# Patient Record
Sex: FEMALE | Race: WHITE | NOT HISPANIC OR LATINO | Employment: FULL TIME | URBAN - METROPOLITAN AREA
[De-identification: names, ages, dates, MRNs, and addresses within clinical notes are randomized per-mention and may not be internally consistent; named-entity substitution may affect disease eponyms.]

---

## 2024-06-03 ENCOUNTER — HOSPITAL ENCOUNTER (OUTPATIENT)
Facility: CLINIC | Age: 29
Setting detail: OBSERVATION
Discharge: STILL ADMITTED OR ADMINISTRATIVE | End: 2024-06-06
Attending: EMERGENCY MEDICINE | Admitting: PSYCHIATRY & NEUROLOGY
Payer: COMMERCIAL

## 2024-06-03 DIAGNOSIS — F43.10 PTSD (POST-TRAUMATIC STRESS DISORDER): ICD-10-CM

## 2024-06-03 DIAGNOSIS — F41.1 GENERALIZED ANXIETY DISORDER WITH PANIC ATTACKS: ICD-10-CM

## 2024-06-03 DIAGNOSIS — R45.851 SUICIDE IDEATION: ICD-10-CM

## 2024-06-03 DIAGNOSIS — F41.0 GENERALIZED ANXIETY DISORDER WITH PANIC ATTACKS: ICD-10-CM

## 2024-06-03 DIAGNOSIS — F33.3 SEVERE EPISODE OF RECURRENT MAJOR DEPRESSIVE DISORDER, WITH PSYCHOTIC FEATURES (H): ICD-10-CM

## 2024-06-03 PROBLEM — F41.9 ANXIETY: Status: ACTIVE | Noted: 2024-06-03

## 2024-06-03 LAB
ANION GAP SERPL CALCULATED.3IONS-SCNC: 17 MMOL/L (ref 7–15)
ATRIAL RATE - MUSE: 110 BPM
BASOPHILS # BLD AUTO: 0 10E3/UL (ref 0–0.2)
BASOPHILS NFR BLD AUTO: 0 %
BUN SERPL-MCNC: 7.3 MG/DL (ref 6–20)
CALCIUM SERPL-MCNC: 9.5 MG/DL (ref 8.6–10)
CHLORIDE SERPL-SCNC: 104 MMOL/L (ref 98–107)
CREAT SERPL-MCNC: 0.62 MG/DL (ref 0.51–0.95)
DEPRECATED HCO3 PLAS-SCNC: 18 MMOL/L (ref 22–29)
DIASTOLIC BLOOD PRESSURE - MUSE: NORMAL MMHG
EGFRCR SERPLBLD CKD-EPI 2021: >90 ML/MIN/1.73M2
EOSINOPHIL # BLD AUTO: 0 10E3/UL (ref 0–0.7)
EOSINOPHIL NFR BLD AUTO: 0 %
ERYTHROCYTE [DISTWIDTH] IN BLOOD BY AUTOMATED COUNT: 12.4 % (ref 10–15)
GLUCOSE SERPL-MCNC: 120 MG/DL (ref 70–99)
HCT VFR BLD AUTO: 39.1 % (ref 35–47)
HGB BLD-MCNC: 13.2 G/DL (ref 11.7–15.7)
HOLD SPECIMEN: NORMAL
HOLD SPECIMEN: NORMAL
IMM GRANULOCYTES # BLD: 0 10E3/UL
IMM GRANULOCYTES NFR BLD: 0 %
INTERPRETATION ECG - MUSE: NORMAL
LYMPHOCYTES # BLD AUTO: 1.4 10E3/UL (ref 0.8–5.3)
LYMPHOCYTES NFR BLD AUTO: 12 %
MCH RBC QN AUTO: 29.2 PG (ref 26.5–33)
MCHC RBC AUTO-ENTMCNC: 33.8 G/DL (ref 31.5–36.5)
MCV RBC AUTO: 87 FL (ref 78–100)
MONOCYTES # BLD AUTO: 0.7 10E3/UL (ref 0–1.3)
MONOCYTES NFR BLD AUTO: 5 %
NEUTROPHILS # BLD AUTO: 9.9 10E3/UL (ref 1.6–8.3)
NEUTROPHILS NFR BLD AUTO: 83 %
NRBC # BLD AUTO: 0 10E3/UL
NRBC BLD AUTO-RTO: 0 /100
P AXIS - MUSE: 65 DEGREES
PLATELET # BLD AUTO: 433 10E3/UL (ref 150–450)
POTASSIUM SERPL-SCNC: 3.9 MMOL/L (ref 3.4–5.3)
PR INTERVAL - MUSE: 142 MS
QRS DURATION - MUSE: 76 MS
QT - MUSE: 326 MS
QTC - MUSE: 441 MS
R AXIS - MUSE: 47 DEGREES
RBC # BLD AUTO: 4.52 10E6/UL (ref 3.8–5.2)
SODIUM SERPL-SCNC: 139 MMOL/L (ref 135–145)
SYSTOLIC BLOOD PRESSURE - MUSE: NORMAL MMHG
T AXIS - MUSE: 32 DEGREES
VENTRICULAR RATE- MUSE: 110 BPM
WBC # BLD AUTO: 12 10E3/UL (ref 4–11)

## 2024-06-03 PROCEDURE — 250N000013 HC RX MED GY IP 250 OP 250 PS 637: Performed by: EMERGENCY MEDICINE

## 2024-06-03 PROCEDURE — 36415 COLL VENOUS BLD VENIPUNCTURE: CPT | Performed by: EMERGENCY MEDICINE

## 2024-06-03 PROCEDURE — 80048 BASIC METABOLIC PNL TOTAL CA: CPT | Performed by: EMERGENCY MEDICINE

## 2024-06-03 PROCEDURE — 93005 ELECTROCARDIOGRAM TRACING: CPT

## 2024-06-03 PROCEDURE — 99285 EMERGENCY DEPT VISIT HI MDM: CPT | Mod: 25

## 2024-06-03 PROCEDURE — 85025 COMPLETE CBC W/AUTO DIFF WBC: CPT | Performed by: EMERGENCY MEDICINE

## 2024-06-03 PROCEDURE — G0378 HOSPITAL OBSERVATION PER HR: HCPCS

## 2024-06-03 RX ORDER — HYDROXYZINE HYDROCHLORIDE 50 MG/1
50 TABLET, FILM COATED ORAL EVERY 6 HOURS PRN
Status: DISCONTINUED | OUTPATIENT
Start: 2024-06-03 | End: 2024-06-06 | Stop reason: HOSPADM

## 2024-06-03 RX ORDER — TRAZODONE HYDROCHLORIDE 50 MG/1
50 TABLET, FILM COATED ORAL
Status: DISCONTINUED | OUTPATIENT
Start: 2024-06-03 | End: 2024-06-04

## 2024-06-03 RX ORDER — NORELGESTROMIN AND ETHINYL ESTRADIOL 35; 150 UG/MG; UG/MG
1 PATCH TRANSDERMAL WEEKLY
COMMUNITY
Start: 2023-10-11

## 2024-06-03 RX ORDER — OLANZAPINE 5 MG/1
5 TABLET, ORALLY DISINTEGRATING ORAL 2 TIMES DAILY PRN
Status: DISCONTINUED | OUTPATIENT
Start: 2024-06-03 | End: 2024-06-04

## 2024-06-03 RX ORDER — LORAZEPAM 1 MG/1
1 TABLET ORAL ONCE
Status: COMPLETED | OUTPATIENT
Start: 2024-06-03 | End: 2024-06-03

## 2024-06-03 RX ORDER — SERTRALINE HYDROCHLORIDE 100 MG/1
100 TABLET, FILM COATED ORAL DAILY
COMMUNITY
End: 2024-06-03

## 2024-06-03 RX ORDER — LANOLIN ALCOHOL/MO/W.PET/CERES
3 CREAM (GRAM) TOPICAL
Status: DISCONTINUED | OUTPATIENT
Start: 2024-06-03 | End: 2024-06-06 | Stop reason: HOSPADM

## 2024-06-03 RX ADMIN — LORAZEPAM 1 MG: 1 TABLET ORAL at 17:41

## 2024-06-03 ASSESSMENT — ACTIVITIES OF DAILY LIVING (ADL)
ADLS_ACUITY_SCORE: 35

## 2024-06-03 NOTE — ED NOTES
Jennifer from [facility] called to give collateral.    Approx 1 month ago had switched from zoloft to prozac and did not titrate in between.  Has been having a hard time adjusting.    Hard time caring for herself.  Her sister has been making her meals - might not be eating during the day.  Only sleeping 5 hours a night.   Increased anxiety.   Slightly paranoid.    Works as an . Was not able to work today.    Main concerns with med change, paranoid thoughts, anxiety, insomnia, not able to care for self.

## 2024-06-03 NOTE — ED NOTES
[facility]  ED to EMPATH Checklist:      Goal for EMPATH: Anxiety management and Medication management    Current Behavior: Calm and Cooperative    Safety Concerns: Visual Hallucinations    Legal Hold Status: Voluntary    Medically Cleared by ED provider: Yes    Patient Therapeutically Searched: Not searched - Currently in triage    Belongings: Remain with patient    Independent Ambulation at Baseline: Yes/No: Yes    Participates in Care/Conversation: Yes/No: Yes    Patient Informed about EMPATH: Yes/No: Yes    DEC: Ordered and pending    Patient Ready to be Transferred to EMPATH? Yes/No: Yes

## 2024-06-03 NOTE — ED TRIAGE NOTES
Pt presents to ed to be evaluated for a medication reaction.   Pt presents with her sister, who states she started fluoxetine a month ago, and is having a reaction to it.   Pt sister states she has been having trouble focusing and has been paranoid.   Pt also reports light sensitivity.         Triage Assessment (Adult)       Row Name 06/03/24 1518          Triage Assessment    Airway WDL WDL        Respiratory WDL    Respiratory WDL WDL        Cardiac WDL    Cardiac WDL WDL        Cognitive/Neuro/Behavioral WDL    Cognitive/Neuro/Behavioral WDL WDL

## 2024-06-04 PROBLEM — F41.0 GENERALIZED ANXIETY DISORDER WITH PANIC ATTACKS: Status: ACTIVE | Noted: 2024-06-04

## 2024-06-04 PROBLEM — F41.1 GENERALIZED ANXIETY DISORDER WITH PANIC ATTACKS: Status: ACTIVE | Noted: 2024-06-04

## 2024-06-04 LAB
AMPHETAMINES UR QL SCN: NORMAL
BARBITURATES UR QL SCN: NORMAL
BENZODIAZ UR QL SCN: NORMAL
BZE UR QL SCN: NORMAL
CANNABINOIDS UR QL SCN: NORMAL
FENTANYL UR QL: NORMAL
HCG UR QL: NEGATIVE
OPIATES UR QL SCN: NORMAL
PCP QUAL URINE (ROCHE): NORMAL

## 2024-06-04 PROCEDURE — 80307 DRUG TEST PRSMV CHEM ANLYZR: CPT | Performed by: PSYCHIATRY & NEUROLOGY

## 2024-06-04 PROCEDURE — 99222 1ST HOSP IP/OBS MODERATE 55: CPT | Mod: AI | Performed by: PSYCHIATRY & NEUROLOGY

## 2024-06-04 PROCEDURE — 81025 URINE PREGNANCY TEST: CPT | Performed by: PSYCHIATRY & NEUROLOGY

## 2024-06-04 PROCEDURE — 250N000013 HC RX MED GY IP 250 OP 250 PS 637

## 2024-06-04 PROCEDURE — G0378 HOSPITAL OBSERVATION PER HR: HCPCS

## 2024-06-04 PROCEDURE — 250N000013 HC RX MED GY IP 250 OP 250 PS 637: Performed by: PSYCHIATRY & NEUROLOGY

## 2024-06-04 RX ORDER — LORAZEPAM 1 MG/1
1 TABLET ORAL ONCE
Status: COMPLETED | OUTPATIENT
Start: 2024-06-04 | End: 2024-06-04

## 2024-06-04 RX ORDER — ASPIRIN 325 MG
325 TABLET ORAL EVERY 6 HOURS PRN
Status: DISCONTINUED | OUTPATIENT
Start: 2024-06-04 | End: 2024-06-06 | Stop reason: HOSPADM

## 2024-06-04 RX ORDER — OLANZAPINE 10 MG/1
10 TABLET, ORALLY DISINTEGRATING ORAL EVERY 6 HOURS PRN
Status: DISCONTINUED | OUTPATIENT
Start: 2024-06-04 | End: 2024-06-06 | Stop reason: HOSPADM

## 2024-06-04 RX ORDER — IBUPROFEN 600 MG/1
600 TABLET, FILM COATED ORAL ONCE
Status: COMPLETED | OUTPATIENT
Start: 2024-06-04 | End: 2024-06-04

## 2024-06-04 RX ORDER — ARIPIPRAZOLE 2 MG/1
2 TABLET ORAL DAILY
Status: DISCONTINUED | OUTPATIENT
Start: 2024-06-04 | End: 2024-06-05

## 2024-06-04 RX ADMIN — ARIPIPRAZOLE 2 MG: 2 TABLET ORAL at 11:10

## 2024-06-04 RX ADMIN — OLANZAPINE 5 MG: 5 TABLET, ORALLY DISINTEGRATING ORAL at 14:57

## 2024-06-04 RX ADMIN — FLUOXETINE 40 MG: 20 CAPSULE ORAL at 07:56

## 2024-06-04 RX ADMIN — LORAZEPAM 1 MG: 1 TABLET ORAL at 15:49

## 2024-06-04 RX ADMIN — IBUPROFEN 600 MG: 600 TABLET ORAL at 07:55

## 2024-06-04 RX ADMIN — MELATONIN TAB 3 MG 3 MG: 3 TAB at 23:08

## 2024-06-04 NOTE — ED NOTES
Pt presenting calmer. She is able to hold a full conversation, sit on the recliner, eat and shower.

## 2024-06-04 NOTE — ED NOTES
Pt ate a snack and has been resting on the recliner. She was up to color and read a book. Pt made a phone call to her sister.

## 2024-06-04 NOTE — PROGRESS NOTES
"29 year old female received from Triage for anxiety and suicidal ideation. Pt reports having a lot of anxiety and states she is \"stressed out too much from work.\" Pt states she works as an  at a [employer] and has done so for two years. Pt reports high anxiety. She states she was on sertraline since Jan of 2023 and about three weeks ago she switched to fluoxetine. Pt feels her current symptoms are related to the switch in medication. She states sertraline made her feel like a zombie and she believes she was on a high dose for too long. Pt states the medication made her slow down and lose track of time. Pt reports passive suicidal ideation but denies a plan or intent and states, \"I was just accepting death but I was not dying.\" Pt also reports poor sleep and states she did not sleep at all last night. Pt denies any hallucinations. Pt denies any HI. Pt denies using drugs and states she only smokes and drinks occasionally but has not done so in a long time. During intake, Pt asked, \"Did I hurt anyone?\" Pt required reassurance and encouragement.     Nursing and risk assessments completed.  Assessments reviewed with LMHP and physician. Video monitoring in progress, patient informed.  Admission information reviewed with patient. Patient given a tour of [facility] and instructions on using the facility. Questions regarding [facility] addressed. Pt search completed and belongings inventoried.  "

## 2024-06-04 NOTE — PROGRESS NOTES
[facility] Clinical Note:    [facility] attempted to meet with pt for DEC assessment. Introduced self and assessment, pt stated that she did not feel well and thought she might throw up. [facility] requested nurse to further assess pt. Pt does not appear to be able to engage in meaningful assessment at this time due to not feeling well. Will attempt again when she is feeling more alert and stable.     Margret ARTEM Cui, TATO on 6/3/2024 at 11:02 PM    [facility] COLLATERAL NOTE:    The following information was received from Feli whose relationship to the patient is sister. Information was obtained via phone. Their phone number is [phone] and they last had contact with patient on 6/3/2024.    What happened today: she called crisis hotline, she was feeling paranoid that she wasn't feeling safe. She wasn't able to speak or think clearly. The team that came out recommended to bring her to the ED.     What is different about patient's functioning: when she started taking the prozac she has started sounding more jumpy and paranoid. When she was on zoloft she seemed more out of it. She thinks this may be a medication reaction.     Concern about alcohol/drug use: No    What do you think the patient needs: to taper off the prozac and see if there would be another medication that might help her better    She does not have a regular psychiatrist who is monitoring medications, maybe a psychiatrist would be helpful    Has patient made comments about wanting to kill themselves/others:  Yes once in a while she seems more depressed. Sometimes she thinks she would be better off not being here but does not actively want to harm herself. There are guns/weapons in the home    If d/c is recommended, can they take part in safety/aftercare planning: No    Other information: Feli would be able to come and pick her up when she is discharged.     ARTEM Cruz, TATO on 6/3/2024 11:04 PM - 11:10 PM
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
## 2024-06-04 NOTE — ED NOTES
Pt spent time in the lounge coloring and working on a puzzle. Pt reports feeling tired. Pt waiting to meet with LMHP.

## 2024-06-04 NOTE — ED NOTES
Pt approached by LM for DEC assessment. Per LMHP, Pt reported feeling nauseous and appeared sedated. Writer approached Pt who stated she felt like she needed to cough to clear her throat. Pt stated she felt like she was choking on her saliva and just had to cough to clear it out. Pt reported feeling anxious. She declined a PRN. Pt given weighted blanket, a heat pack and water. Pt stated she felt tired and just wanted to sleep. Pt declined a Sensory Room. Vitals were 147/86 with a pulse of 92 and 98% O2 on room air. Pt later able to drink water and was up ambulating and socializing with staff.

## 2024-06-04 NOTE — ED PROVIDER NOTES
[facility] Unit - Initial Psychiatric Observation Note  [facility] Emergency Department  Observation Initiation Date: Robert 3, 2024    Fernanda Wiley MRN: [id number]   Age: 29 year old YOB: 1995     History     Chief Complaint   Patient presents with    Medication Reaction     HPI  Fernanda Wiley is a 29 year old female with a past history notable for a generalized anxiety disorder and major depressive disorder who presents to the emergency department reporting concern for worsening anxiety.  She was determined to be medically stable and transferred to the [facility] unit for psychiatric assessment.  She is now approaching 20 hours in the emergency department.  Overnight, there were no acute issues.  On examination today, the patient reviewed with me various work-related stressors that have contributed to a heightened state of anxiety.  She has attempted to utilize time away from work while attempting to exercise previous therapy techniques aimed at managing her anxiety however symptoms have continued to progress.  She worked with her outpatient prescriber on changing her antidepressant plan, leading to discontinuation of Zoloft, which she was taking for nearly 2 years however interpreted fatigue and emotional numbing as side effects, and transition onto Prozac 40 mg daily.  Shortly after the medication change, she noted some improvement in symptom intensity and thought clarity however as she continued to expose herself to work-related stressors, symptom intensity resumed.  Feeling overwhelmed by the intensity of the symptoms, she came to the emergency room for additional help.  She is not currently seeing a therapist while reporting that her prior therapist moved to a different state.  She denied auditory and visual hallucinations.  She denied suicidal and homicidal thoughts.  Sleep is poor as she described delayed onset of sleep due to anxious and ruminating thoughts and difficulty feeling  "comfortable.  Appetite has been fair.  Energy has been low.  Concentration has been poor.  Anhedonia has been moderate.    Past Medical History  No past medical history on file.  No past surgical history on file.  FLUoxetine (PROZAC) 20 MG capsule  norelgestromin-ethinyl estradiol (XULANE) 150-35 MCG/24HR patch      No Known Allergies  Family History  No family history on file.  Social History           Review of Systems  A medically appropriate review of systems was performed with pertinent positives and negatives noted in the HPI, and all other systems negative.    Physical Examination   BP: (!) 155/88  Pulse: 120  Temp: 98.2  F (36.8  C)  Resp: 19  Height: 165.1 cm (5' 5\")  Weight: 71.2 kg (156 lb 14.4 oz)  SpO2: 97 %    Physical Exam  General: Appears stated age.   Neuro: Alert and fully oriented. Extremities appear to demonstrate normal strength on visual inspection.   Integumentary/Skin: no rash visualized, normal color    Psychiatric Examination   Appearance: awake, alert  Attitude:  cooperative  Eye Contact:  poor   Mood:  anxious and sad   Affect:  appropriate and in normal range  Speech:  clear, coherent  Psychomotor Behavior:  no evidence of tardive dyskinesia, dystonia, or tics  Thought Process:  logical and linear  Associations:  no loose associations  Thought Content:  no evidence of suicidal ideation or homicidal ideation and no evidence of psychotic thought  Insight:  fair  Judgement:  fair  Oriented to:  time, person, and place  Attention Span and Concentration:  fair  Recent and Remote Memory:  fair  Language: able to name/identify objects without impairment  Fund of Knowledge: intact with awareness of current and past events    ED Course        Labs Ordered and Resulted from Time of ED Arrival to Time of ED Departure   BASIC METABOLIC PANEL - Abnormal       Result Value    Sodium 139      Potassium 3.9      Chloride 104      Carbon Dioxide (CO2) 18 (*)     Anion Gap 17 (*)     Urea Nitrogen 7.3   "    Creatinine 0.62      GFR Estimate >90      Calcium 9.5      Glucose 120 (*)    CBC WITH PLATELETS AND DIFFERENTIAL - Abnormal    WBC Count 12.0 (*)     RBC Count 4.52      Hemoglobin 13.2      Hematocrit 39.1      MCV 87      MCH 29.2      MCHC 33.8      RDW 12.4      Platelet Count 433      % Neutrophils 83      % Lymphocytes 12      % Monocytes 5      % Eosinophils 0      % Basophils 0      % Immature Granulocytes 0      NRBCs per 100 WBC 0      Absolute Neutrophils 9.9 (*)     Absolute Lymphocytes 1.4      Absolute Monocytes 0.7      Absolute Eosinophils 0.0      Absolute Basophils 0.0      Absolute Immature Granulocytes 0.0      Absolute NRBCs 0.0         Assessments & Plan (with Medical Decision Making)   Patient presenting with heightened state of anxiety and occasional panic attacks further complicated by worsening depressive symptoms in the midst of work-related stress.  A recent medication change was initially beneficial however those benefits quickly diminished as she continued to experience work related stress.  Her treatment plan is focused on optimizing her medication plan aimed at treating anxiety and depressive symptoms while helping to improve her access to outpatient mental health treatments. Nursing notes reviewed noting no acute issues.     I have reviewed the assessment completed by the [facility].     During the observation period, the patient did not require medications for agitation, and did not require restraints/seclusion for patient and/or provider safety.     The patient was found to have a psychiatric condition that would benefit from an observation stay in the emergency department for further psychiatric stabilization and/or coordination of a safe disposition. The observation plan includes serial assessments of psychiatric condition, potential administration of medications if indicated, further disposition pending the patient's psychiatric course during the monitoring period.      Preliminary diagnosis:    ICD-10-CM    1. Suicide ideation  R45.851       2. Generalized anxiety disorder with panic attacks  F41.1     F41.0       3. MDD (major depressive disorder), recurrent severe, without psychosis (H)  F33.2            Treatment Plan:  -Continue Prozac 40 mg daily for antianxiety and antidepressant treatment.  The dose was initiated about 1 month ago and higher dosing may be considered over the next 2 to 3 weeks if symptom intensity persists.  -Add Abilify 2 mg daily for augmentation of Prozac as we aim to gain additional reduction in her mood and anxiety symptoms.  Risks and benefits were reviewed including the importance of monitoring for metabolic side effects.  -Melatonin 3 mg nightly as needed for insomnia  -Urine pregnancy test and drug screen have been ordered  -Referral for individual psychotherapy and outpatient psychiatric medication management  -Enter to observation status and reassess tomorrow.    --  Anoop Aguilar MD   [facility] EMERGENCY DEPT  [facility] Unit       Anoop Aguilar MD  06/04/24 1263

## 2024-06-04 NOTE — ED NOTES
"Pt presenting psychotic, she is disorganized, appears tense with a wide eye stare, and requires 1:1 staff for redirection and encouragement. Pt observed standing in the bathroom doorway talking and gesturing to herself. She requires constant redirection. Pt showing derealization, observed tapping on the table, her cheek and touching staff stating, \"I am real. You are real.\" Provider notified who ordered lorazepam 1mg which patient took with much encouragement. Pt   "

## 2024-06-04 NOTE — PROGRESS NOTES
Fernanda Wiley  June 4, 2024  Plan of Care Hand-off Note     Patient Care Path: observation    Plan for Care:   Patient came to the ED last evening via sister after calling [facility] for mental health crisis support.  She reports feeling paranoia and anxious with insight that she was spiraling so needed some extra help.  Patient endorses passive SI as she does not want to live like this anymore.  Patient feels like her medications are not effective and contributing to the increased anxiousness while triggering some trauma memories.  She has not been IP[facility] admitted previously.  Patient endorses anxiousness and paranoia with some traits of obsessive thought patterns.  Patient is willing to stay in observation tonight as she is focused on medication adjustments.   We will also work on setting her up with outpatient providers to follow up with for therapy and psychiatry.    Identified Goals and Safety Issues: medication stabilization    Overview:  (P) Feli, , [phone]      Legal Status: Legal Status at Admission: Voluntary/Patient has signed consent for treatment    Psychiatry Consult:  No as patient is on EmPATH and seen by their provider.     Updated provider and RN regarding plan of care.      YODIT Figueroa, [facility]  Licensed Mental Health Professional (LMHP)  EmPATH, [phone]

## 2024-06-04 NOTE — ED NOTES
Pt. began talking to herself and rocking. Reports that she is starting to have a panic attack. Fearful. Mumbling random phrases. Medicated with Zyprexa 5mg. Currently sitting with patient to provide support and reassurance. Assisting with breathing exercises and grounding techniques.

## 2024-06-04 NOTE — ED NOTES
"Pt. restless and uncomfortable in appearance. Ruminating. Reports feeling very stressed and \"unstable\" . Becomes tearful very easily. Ate breakfast. Met with LMHP and psychiatrist. Started on ability to augment prozac. Denies current SI. Admits to continued fearfulness and paranoia. Having trouble organizing thoughts at times.   "

## 2024-06-05 ENCOUNTER — TELEPHONE (OUTPATIENT)
Dept: BEHAVIORAL HEALTH | Facility: CLINIC | Age: 29
End: 2024-06-05
Payer: COMMERCIAL

## 2024-06-05 PROBLEM — F43.10 PTSD (POST-TRAUMATIC STRESS DISORDER): Status: ACTIVE | Noted: 2024-06-05

## 2024-06-05 PROCEDURE — 99233 SBSQ HOSP IP/OBS HIGH 50: CPT | Performed by: PSYCHIATRY & NEUROLOGY

## 2024-06-05 PROCEDURE — G0378 HOSPITAL OBSERVATION PER HR: HCPCS

## 2024-06-05 PROCEDURE — 250N000013 HC RX MED GY IP 250 OP 250 PS 637: Performed by: EMERGENCY MEDICINE

## 2024-06-05 PROCEDURE — 250N000013 HC RX MED GY IP 250 OP 250 PS 637: Performed by: PSYCHIATRY & NEUROLOGY

## 2024-06-05 PROCEDURE — 250N000013 HC RX MED GY IP 250 OP 250 PS 637

## 2024-06-05 RX ORDER — LORAZEPAM 1 MG/1
1 TABLET ORAL ONCE
Status: COMPLETED | OUTPATIENT
Start: 2024-06-05 | End: 2024-06-05

## 2024-06-05 RX ORDER — LORAZEPAM 1 MG/1
1 TABLET ORAL EVERY 4 HOURS PRN
Status: DISCONTINUED | OUTPATIENT
Start: 2024-06-05 | End: 2024-06-06 | Stop reason: HOSPADM

## 2024-06-05 RX ORDER — ARIPIPRAZOLE 5 MG/1
5 TABLET ORAL DAILY
Status: DISCONTINUED | OUTPATIENT
Start: 2024-06-06 | End: 2024-06-06 | Stop reason: HOSPADM

## 2024-06-05 RX ADMIN — OLANZAPINE 10 MG: 10 TABLET, ORALLY DISINTEGRATING ORAL at 02:24

## 2024-06-05 RX ADMIN — LORAZEPAM 1 MG: 1 TABLET ORAL at 20:49

## 2024-06-05 RX ADMIN — FLUOXETINE 40 MG: 20 CAPSULE ORAL at 10:34

## 2024-06-05 RX ADMIN — LORAZEPAM 1 MG: 1 TABLET ORAL at 14:43

## 2024-06-05 RX ADMIN — ARIPIPRAZOLE 2 MG: 2 TABLET ORAL at 10:33

## 2024-06-05 RX ADMIN — MELATONIN TAB 3 MG 3 MG: 3 TAB at 21:54

## 2024-06-05 NOTE — TELEPHONE ENCOUNTER
S: NICKY Christopher  Jung  calling at 2:25 PM about a 29 year old/Female presenting with confusion, trouble sleeping, obsessive thoughts. Catatonic presentation-- Needs prompts for ADLs      B: Pt arrived via EMS. Presenting problem, stressors: work has been stressful, medication changes     Pt affect in ED: Flat  Pt Dx: Generalized Anxiety Disorder  Previous [facility] hx? No  Pt denies SI   Hx of suicide attempt? No  Pt denies SIB  Pt denies HI   Pt denies hallucinations .   Pt RARS Score: 4    Hx of aggression/violence, sexual offenses, legal concerns, Epic care plan? describe: No  Current concerns for aggression this visit? No  Does pt have a history of Civil Commitment? No  Is Pt their own guardian? Yes    Pt is prescribed medication. Is patient medication compliant? Yes  Pt denies OP services  PCP for med management   CD concerns: None  Acute or chronic medical concerns: no  Does Pt present with specific needs, assistive devices, or exclusionary criteria? None      Pt is ambulatory  Pt is able to perform ADLs independently      A: Pt to be reviewed for Critical access hospital admission. Pt is Voluntary  Preferred placement: [facility]    COVID Symptoms: No  If yes, COVID test required   Utox: Negative   CMP: Abnormalities: CO2: 18; Anion Gap: 17; Glucose: 120  CBC: Abnormalities: WBC count: 12; Absolute Neutrophils: 9.9   HCG: Negative    R: Patient cleared and ready for behavioral bed placement: Yes  Pt placed on Critical access hospital worklist? Yes    Does Patient need a Transfer Center request created? Yes, writer completed Transfer Center request at: 2:32 PM

## 2024-06-05 NOTE — ED NOTES
"Pt. awaken for vitals. Sleeping in the fetal position. Vitals stable. Dazed and confused in presentation. Mumbling. Reports that she feels \"sleep deprived\" Declined breakfast requesting to sleep awhile longer.    "

## 2024-06-05 NOTE — ED NOTES
Pt showered, did ADL's, ate dinner and has spent time resting on the recliner. Pt presents calm in mood, has a flat affect. Pt reports feeling better. She provided urine sample. Pt denies any SI/HI/[place]. She expressed feeling embarrassed for earlier behavior where she states she felt disoriented.

## 2024-06-05 NOTE — ED NOTES
Pt. more alert and slightly more organized after dose of ativan. Able to sit up and work on water coloring project.  Continues to mumble to herself. Continues to have periods of being fearful and slow to respond.Denies suicidal ideation. Reminded that the plan is for her to transition to Inpatient . Currently demonstrating increased appetite-ate 100% of dinner. Less rigid and less tense.
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
## 2024-06-05 NOTE — ED NOTES
Assisted with shower as she was unstable on feet and disorganized. Slightly more alert after shower. WIll met with [facility] for re-evaluation soon.

## 2024-06-05 NOTE — ED NOTES
"Assisted patient to sit up. Given AM medication. Mumbling random statements but is oriented to place and date. Able to remember events of past 24 hours. C/O of being \"sweaty\" and cold. Encouraging patient to eat breakfast then will assist with shower.    "

## 2024-06-05 NOTE — PROGRESS NOTES
"Pt walked up to nurses station around 0230 and reported that she woke up a little early and was feeling a bit anxious; requested something that would help with sleep and anxiety. PRN zyprexa given this shift per order; effective. Pt was able to go back to sleep. Around 0630, pt woke up and was observed exhibiting the same behaviors she did yesterday evening before entering the BR. ED provider contacted for a one time dose of lorazepam. When pt came out of BR, she stated \"I just woke up but I feel so groggy\". Pt was assisted back to her chair. Will hold ativan for now d/t pt current status and will notify oncoming shift nurse. Pt in chair resting.   "

## 2024-06-05 NOTE — TELEPHONE ENCOUNTER
R: [place]  Access Inpatient Bed Call Log 6/5/24 8:35 AM    Intake has called facilities that have not updated the bed status within the last 12 hours.                             [facility] is at capacity           [facility] is posting 0 beds. [phone] Per call @8:35am, at cap. Can try around 5pm.  [facility] is posting 0 beds. Negative covid required             [facility] is posting 0 beds. Neg covid. No high school/[facility]-psych. [phone] Per call @8:34am, at [facility] is posting 0 beds. [phone]  [facility] is posting 0 beds. [phone]   [facility] is posting 2 beds. Negative covid. [phone] Per call @8:20am Pt not age appropriate   [facility] is posting 0 beds [phone]    Pt remains on the work list pending appropriate bed availability.

## 2024-06-05 NOTE — PROGRESS NOTES
"Triage & Transition Services, Extended Care     Therapy Progress Note    Patient: Fernanda goes by \"Fernanda,\" uses she/her pronouns  Date of Service: June 5, 2024  Site of Service: [facility] EMERGENCY DEPT                             [id number]    Patient was seen yes  Mode of Assessment: In person    Presentation Summary: Patient was sitting in her recliner when writer approached.  She was able to engage in brief conversation, with soft speech, and agreed to meet in consult room to discuss plan for the day.  Her presentation is odd as she does not make much eye contact, speech is soft and mumbled, and thoughts are obsessive yet however she is able to smile at statements by writer that are funny.  Patient whispers that she needs to take care of others and will appear to doze off in mid sentence.  She expresses feeling that she is regaining sense of time.  Patient identifies her obsessive focus on others and knows that it is not good.  Patient is agreeable to writer's recommendation to meeting with provider to talk about other medications to help decrease the spiral her brain is in.  She expresses trust in writer and states she will follow recommendations.  Patient states she is tired now and writer walked with her back to her recliner.    Therapeutic Intervention(s) Provided: Engaged in safety planning, Engaged in guided discovery, explored patient's perspectives and helped expand them through socratic dialogue.    Current Symptoms: obsessions/compulsions, anxious, excessive worry helplessness, difficulty concentrating, low self esteem, impaired decision making anxious forgetful, psychomotor retardation  (no changes observed)    Mental Status Exam   Affect: Blunted, Flat  Appearance: Appropriate  Attention Span/Concentration: Inattentive  Eye Contact: Variable    Fund of Knowledge: Delayed   Language /Speech Content: Fluent  Language /Speech Volume: Soft  Language /Speech Rate/Productions: Slow, " Minimally Responsive  Recent Memory: Intact  Remote Memory: Intact  Mood: Anxious, Depressed  Orientation to Person: Yes   Orientation to Place: Yes  Orientation to Time of Day:  (unclear)  Orientation to Date:  (unclear)     Situation (Do they understand why they are here?): Yes  Psychomotor Behavior: Underactive  Thought Content: Clear  Thought Form: Obsessive/Perseverative    Treatment Objective(s) Addressed: rapport building, orienting the patient to therapy, processing feelings, assessing safety, identifying additional supports, exploring obstacles to safety in the community    Patient Response to Interventions: acceptance expressed, verbalizes understanding    Progress Towards Goals: Patient Reports Symptoms Are: worsening  Patient Progress Toward Goals: is not making progress  Comment: cotinues to have mumbled and soft speech, thoughts are obsessive in nature and often focused on helping others, needs prompts to complete tasks  Next Step to Work Toward Discharge: symptom stabilization  Symptom Stabilization Comment: find stabilization in presentation    Case Management: Case Management Included:  (none)    Plan: observation  yes provider, RN Andish  yes    Clinical Substantiation: Patient is not very clear with thoughts today. RN has had to  patient through how to take a shower today.  Patient's thoughts are obsessive in nature of needing to help others.  Her speech is mumbled and soft, affect is flat, eye contact is poor.  After coaching and education, patient does understand that she needs more support and help so willing to consider more medications and IP[facility] admission.  Writer put patient on work list for admission.    Legal Status: Legal Status at Admission: Voluntary/Patient has signed consent for treatment    Session Status: Time session started: 1120  Time session ended: 1136  Session Duration (minutes): 16 minutes  Session Number: 1  Anticipated number of sessions or this episode of care:  3    Time Spent: 16 minutes    CPT Code: CPT Codes: 35842 - Psychotherapy (with patient) - 30 (16-37*) min    Diagnosis:   Patient Active Problem List   Diagnosis Code    Anxiety F41.9    Suicide ideation R45.851    Generalized anxiety disorder with panic attacks F41.1, F41.0    PTSD (post-traumatic stress disorder) F43.10       Primary Problem This Admission: Active Hospital Problems    PTSD (post-traumatic stress disorder)      Generalized anxiety disorder with panic attacks      Anxiety      Suicide ideation      Jung Adams, YODIT, [facility]  Licensed Mental Health Professional (LMHP)  EmPATH, [phone]

## 2024-06-06 ENCOUNTER — TELEPHONE (OUTPATIENT)
Dept: BEHAVIORAL HEALTH | Facility: CLINIC | Age: 29
End: 2024-06-06
Payer: COMMERCIAL

## 2024-06-06 VITALS
OXYGEN SATURATION: 100 % | TEMPERATURE: 97.4 F | BODY MASS INDEX: 26.14 KG/M2 | WEIGHT: 156.9 LBS | HEART RATE: 105 BPM | HEIGHT: 65 IN | RESPIRATION RATE: 18 BRPM | DIASTOLIC BLOOD PRESSURE: 93 MMHG | SYSTOLIC BLOOD PRESSURE: 141 MMHG

## 2024-06-06 PROCEDURE — 250N000013 HC RX MED GY IP 250 OP 250 PS 637

## 2024-06-06 PROCEDURE — 250N000013 HC RX MED GY IP 250 OP 250 PS 637: Performed by: PSYCHIATRY & NEUROLOGY

## 2024-06-06 PROCEDURE — G0378 HOSPITAL OBSERVATION PER HR: HCPCS

## 2024-06-06 RX ADMIN — LORAZEPAM 1 MG: 1 TABLET ORAL at 13:34

## 2024-06-06 RX ADMIN — FLUOXETINE 40 MG: 20 CAPSULE ORAL at 08:40

## 2024-06-06 RX ADMIN — LORAZEPAM 1 MG: 1 TABLET ORAL at 08:40

## 2024-06-06 RX ADMIN — ARIPIPRAZOLE 5 MG: 5 TABLET ORAL at 08:40

## 2024-06-06 ASSESSMENT — ACTIVITIES OF DAILY LIVING (ADL)
ADLS_ACUITY_SCORE: 35

## 2024-06-06 NOTE — ED NOTES
Slightly improved as the morning progressed. Making some statements that are relevant-talking about mind racing and having difficulty determining reality. Talking about sister and job. Continues to required direction due to disorganization and difficulty focusing. Awaiting Inpatient bed placement.

## 2024-06-06 NOTE — TELEPHONE ENCOUNTER
R: [place]  Access Inpatient Bed Call Log  6/5/2024 4:51 AM  Intake has called facilities that have not updated their bed status within the last 12 hours.??      ADULTS:     *[facility]  [place] -- [facility]: @ cap per website.  [place] -- [facility]:  @ cap per website.   [place] -- [facility]: @ Cap per website.  [place] -- [facility]: @ cap per website.   [place] -- [facility]: @ Cap per website.  [facility] -- [facility]: @ cap per website.   [place] -- [facility]/YA beds @Posting 1 beds. Ages 18-28, Voluntary only, COVID test req'd, NO aggression, physical or sexual assault, violence hx or drug abuse, or psychosis   [place] -- [facility]: @ Cap per website.  [place] -- [facility]: @ cap per website.  [place] -- [facility]:  @ Cap per website.      Pt remains on waitlist pending appropriate placement availability.

## 2024-06-06 NOTE — TELEPHONE ENCOUNTER
R: [place]  Access Inpatient Bed Call Log 6/6/24 @  7:07 am:    Intake has called facilities that have not updated the bed status within the last 12 hours.  ([facility])                             [facility] is at capacity            [facility] is posting 0 beds. [phone]; per call at 7:07 am to Ayesha, they are at cap.    [facility] is posting 0 beds. Negative covid required              [facility] is posting 0 beds. Neg covid. No high school/[facility]armin-psych. [phone]; per call at 7:08 am to Suzette, they are at cap.    [facility] is posting 0 beds. [phone]   [facility] is posting 0 beds. [phone]    [facility] is posting 1 bed. Ages 18-28. Negative covid. [phone]; per call at 7:10 am to Adelaida, they have no y/a beds, a handful of adol beds, no child beds, no eugene beds and no privates avail. Pt not appropriate d/t unit age restrictions.   [facility] is posting 0 beds. [phone]   [facility] is posting 0 beds [phone]     9:56 AM Per call to Thalia at  Direct can review pt for [facility]. Intake to fax clinical.             10:26 AM Fax sent for review.             1:17 PM [facility] is needing to complete doc to doc and left number with ED and have not received callback.             1:21 PM Writer spoke with RN at [facility] and will ask provider to complete doc to doc when available.             2:38 PM Doc to doc has been completed. Awaiting update from [facility] Patient Placement.             3:23 PM Patient accepted to [facility] NE [id number] accepting Dr. Mack. RN report [phone]. ED is informed. Awaiting transport.             3:26 PM Confirmed with [facility] d/t notifying ED and not PPS.                   Pt remains on the work list pending appropriate bed availability.

## 2024-06-06 NOTE — PROGRESS NOTES
Transfer instructions reviewed with patient including follow-up care plan. Patient transferred to [facility] to in stable condition via ambulance. Denies SI/HI.
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
## 2024-06-06 NOTE — ED NOTES
Around 2045 patient came to the desk having trouble breathing and mumbling. Tense and rigid.  Reports that she is fearful . Starting to perspire. Medicated with Ativan 1mg and assisted to engage in calming/distraction activities.

## 2024-06-06 NOTE — PROGRESS NOTES
Triage & Transition Services, Extended Care     Client Name: Fernanda Wiley    Date: June 6, 2024    Patient was seen yes  Mode of Assessment: In person    Service Type: (P) did not attend group session  Session Start Time:  (P) 1000    Session End Time: (P) 1005  Session Length: (P) 5  Site Location: [facility] EMERGENCY DEPT                             EMP12  Total Number ofAttendees: (P) 0    YODIT Figueroa, [facility]  Licensed Mental Health Professional (LMHP)  [facility], [phone]